# Patient Record
Sex: MALE | Race: OTHER | HISPANIC OR LATINO | URBAN - METROPOLITAN AREA
[De-identification: names, ages, dates, MRNs, and addresses within clinical notes are randomized per-mention and may not be internally consistent; named-entity substitution may affect disease eponyms.]

---

## 2023-01-20 ENCOUNTER — EMERGENCY (EMERGENCY)
Facility: HOSPITAL | Age: 43
LOS: 0 days | Discharge: HOME | End: 2023-01-20
Attending: STUDENT IN AN ORGANIZED HEALTH CARE EDUCATION/TRAINING PROGRAM | Admitting: STUDENT IN AN ORGANIZED HEALTH CARE EDUCATION/TRAINING PROGRAM
Payer: OTHER MISCELLANEOUS

## 2023-01-20 VITALS
DIASTOLIC BLOOD PRESSURE: 76 MMHG | TEMPERATURE: 98 F | RESPIRATION RATE: 17 BRPM | OXYGEN SATURATION: 99 % | WEIGHT: 179.9 LBS | HEIGHT: 67 IN | SYSTOLIC BLOOD PRESSURE: 136 MMHG | HEART RATE: 75 BPM
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
## 2023-01-20 DIAGNOSIS — S82.002A UNSPECIFIED FRACTURE OF LEFT PATELLA, INITIAL ENCOUNTER FOR CLOSED FRACTURE: ICD-10-CM
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
## 2023-01-20 DIAGNOSIS — M25.562 PAIN IN LEFT KNEE: ICD-10-CM

## 2023-01-20 DIAGNOSIS — M25.561 PAIN IN RIGHT KNEE: ICD-10-CM
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
## 2023-01-20 PROCEDURE — 73564 X-RAY EXAM KNEE 4 OR MORE: CPT | Mod: 26,50

## 2023-01-20 PROCEDURE — 73552 X-RAY EXAM OF FEMUR 2/>: CPT | Mod: 26,LT

## 2023-01-20 PROCEDURE — 99284 EMERGENCY DEPT VISIT MOD MDM: CPT

## 2023-01-20 PROCEDURE — 73590 X-RAY EXAM OF LOWER LEG: CPT | Mod: 26,LT

## 2023-01-20 PROCEDURE — 72170 X-RAY EXAM OF PELVIS: CPT | Mod: 26

## 2023-01-20 RX ORDER — OXYCODONE AND ACETAMINOPHEN 5; 325 MG/1; MG/1
1 TABLET ORAL
Qty: 9 | Refills: 0
Start: 2023-01-20 | End: 2023-01-22

## 2023-01-20 RX ORDER — KETOROLAC TROMETHAMINE 30 MG/ML
1 SYRINGE (ML) INJECTION
Qty: 16 | Refills: 0
Start: 2023-01-20 | End: 2023-01-23

## 2023-01-20 RX ORDER — KETOROLAC TROMETHAMINE 30 MG/ML
60 SYRINGE (ML) INJECTION ONCE
Refills: 0 | Status: DISCONTINUED | OUTPATIENT
Start: 2023-01-20 | End: 2023-01-20

## 2023-01-20 RX ADMIN — Medication 60 MILLIGRAM(S): at 19:57

## 2023-01-20 NOTE — ED PROVIDER NOTE - NSFOLLOWUPCLINICS_GEN_ALL_ED_FT
[facility]  Orthpedic  [address], [place]   Phone: [phone]  Fax:   Follow Up Time: 4-6 Days

## 2023-01-20 NOTE — ED PROVIDER NOTE - PHYSICAL EXAMINATION
PHYSICAL EXAM:    Constitutional: awake, alert, NAD  Eyes: EOMI, no conj injection  HENT: NC AT  Back: no c/t/l spine ttp  Respiratory: no respiratory distress, breath sounds equal b/l, no wheezing, rhonchi or stridor.   Cardiovascular: RRR nml S1S2  Gastrointestinal: soft, no masses, nontender, nondistended. No guarding or rebound.   Neurological: AAOx3, CN II-XII grossly intact, no focal numbness or focal weakness  MSK  LLE- no hip tenderness, no femur tenderness, swollen knee w/ anterior ecchymosis and tenderness most to superior/medial aspect, no severe joint laxity, no tib/fib tenderness, no ankle tenderness, ROM to ankle intact, DP 2+, sensation to extremity intact  RLE- no hip tenderness, no femur tenderness, minimally swollen knee w/o ecchymosis, mild tenderness to superior/medial aspect, no severe joint laxity, no tib/fib tenderness, no ankle tenderness, ROM to ankle intact, DP 2+, sensation to extremity intact

## 2023-01-20 NOTE — ED ADULT NURSE NOTE - CAS TRG GEN SKIN CONDITION
Body Location Override (Optional - Billing Will Still Be Based On Selected Body Map Location If Applicable): Right upper back Add 62717 Cpt? (Important Note: In 2017 The Use Of 02899 Is Being Tracked By Cms To Determine Future Global Period Reimbursement For Global Periods): no Detail Level: Detailed Warm

## 2023-01-20 NOTE — ED PROVIDER NOTE - PATIENT PORTAL LINK FT
You can access the FollowMyHealth Patient Portal offered by [facility] by registering at the following website: [email or web address]. By joining [employer]’s FollowMyHealth portal, you will also be able to view your health information using other applications (apps) compatible with our system.

## 2023-01-20 NOTE — ED PROVIDER NOTE - PROGRESS NOTE DETAILS
pain improved w/ toradol, xr w/ fx, knee immobilizer placed   pt ambulated in ED w/ crutches and is comfortable w/ dc

## 2023-01-20 NOTE — ED PROVIDER NOTE - DIFFERENTIAL DIAGNOSIS
c/f L knee fx, r/o patella fx, tib plateau fx, no e/o hip fx, dislocation   R knee- less likely fx, no e/o dislocation Differential Diagnosis

## 2023-01-20 NOTE — ED ADULT NURSE NOTE - NSIMPLEMENTINTERV_GEN_ALL_ED
Implemented All Fall with Harm Risk Interventions:  [place] to call system. Call bell, personal items and telephone within reach. Instruct patient to call for assistance. Room bathroom lighting operational. Non-slip footwear when patient is off stretcher. Physically safe environment: no spills, clutter or unnecessary equipment. Stretcher in lowest position, wheels locked, appropriate side rails in place. Provide visual cue, wrist band, yellow gown, etc. Monitor gait and stability. Monitor for mental status changes and reorient to person, place, and time. Review medications for side effects contributing to fall risk. Reinforce activity limits and safety measures with patient and family. Provide visual clues: red socks.

## 2023-01-20 NOTE — ED PROVIDER NOTE - CLINICAL SUMMARY MEDICAL DECISION MAKING FREE TEXT BOX
xr c/w patella fx- L knee placed in knee immobilizer, R knee ace wrapped  pt given crutches and able to ambulate  f/u and return precautions discussed  pt and family understands

## 2023-01-20 NOTE — ED ADULT TRIAGE NOTE - CHIEF COMPLAINT QUOTE
Pt is a  BIBA s/p fall from 5-ft high stilts while at work. Pt landed on his feet, c/o left knee pain. No head injury. No LOC. Not taking any anticoagulant.

## 2023-01-20 NOTE — ED PROVIDER NOTE - NSFOLLOWUPINSTRUCTIONS_ED_ALL_ED_FT
Pt arrives via EMS. Pt was with family getting breakfast and began having a difficult time breathing. Pt was here 2 weeks ago for pna. For EMS pt was in AF, pt does not recall ever having AF previously. -160s en route. Denies CP.
Take toradol and acetaminophen 650mg every 8 hours for pain.  Take percocet as needed for severe pain. Do note exceed 4000mg of acetaminophen in 24 hours. Follow up with orthopedic surgery within 1 week. Return for worsening pain, swelling, numbness/tingling/weakness to foot or other concerning symptoms.    Our Emergency Department Referral Coordinators will be reaching out to you in the next 24-48 hours from 9:00am to 5:00pm with a follow up appointment. Please expect a phone call from the hospital in that time frame. If you do not receive a call or if you have any questions or concerns, you can reach them at [phone] or [phone].          Fracture    A fracture is a break in one of your bones. This can occur from a variety of injuries, especially traumatic ones. Symptoms include pain, bruising, or swelling. Do not use the injured limb. If a fracture is in one of the bones below your waist, do not put weight on that limb unless instructed to do so by your healthcare provider. Crutches or a cane may have been provided. A splint or cast may have been applied by your health care provider. Make sure to keep it dry and follow up with an orthopedist as instructed.    SEEK IMMEDIATE MEDICAL CARE IF YOU HAVE ANY OF THE FOLLOWING SYMPTOMS: numbness, tingling, increasing pain, or weakness in any part of the injured limb.

## 2023-01-20 NOTE — ED PROVIDER NOTE - OBJECTIVE STATEMENT
42 yo m no pmh presents for eval of knee pain. 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 no head/neck injury. no chest/pelvis trauma. no loc/n/v. pt not on ac or antiplt.  pt states L knee hurts more than R and is swollen. R knee has mild pain.  no numbness/weakness to legs. no ankle pain/low back pain.

## 2023-01-23 ENCOUNTER — APPOINTMENT (OUTPATIENT)
Dept: ORTHOPEDIC SURGERY | Facility: CLINIC | Age: 43
End: 2023-01-23
Payer: OTHER MISCELLANEOUS

## 2023-01-23 PROBLEM — Z00.00 ENCOUNTER FOR PREVENTIVE HEALTH EXAMINATION: Status: ACTIVE | Noted: 2023-01-23

## 2023-01-23 PROCEDURE — 99072 ADDL SUPL MATRL&STAF TM PHE: CPT

## 2023-01-23 PROCEDURE — 99204 OFFICE O/P NEW MOD 45 MIN: CPT

## 2023-01-23 NOTE — HISTORY OF PRESENT ILLNESS
[de-identified] : 43 year old male presents with LT knee pain and swelling status post falling from about 5 feet above the ground at work which happened on 01/20/2023. He has a knee brace which is helping him. \par \par Denies any medical conditions or taking any medications.\par \par X-ray reviewed and analyzed. Shows a patella fracture, no arthritis,  displaced 3 cm distal pole with comminution\par \par On exam, ecchymosis  over the anterior aspect of the knee  ,no wounds, large knee effusion, significant swelling, tender to palpation unable to straight leg raise\par \par Recommending surgery to repair the medial and lateral capsule of the LT knee and fracture fixation or excision,for urgent surgery. He will follow-up with me next week Monday. son was present during the exam and discussion\par Surgical Discussion (general)\par \par The patient was advised of the diagnosis.  The natural history of the pathology was explained in full to the patient in layman's terms. All questions were answered.  The risks and benefits of surgical and non-surgical treatment alternatives were explained in full to the patient. \par \par The patient demonstrated a full understanding of the surgical and non-surgical options.  The risks of surgery were outlined in full to the patient including but not limited to bleeding, scarring, infection, sepsis, neurologic injury, vascular injury, failure to resolve symptoms, symptom recurrence, the need for further surgery, non-healing, wound breakdown, deep vein thrombosis, pulmonary embolism, spontaneous osteonecrosis, anesthesia complications and even death.  The patient understood all the risks and accepted them and understood that other complications could occur that are not mentioned above.  The intraoperative plan, post-operative plan, post-operative expectations and limitations were explained in full.  Expectations from non-surgical treatment were explained in full as well.  The patient demonstrated a complete understanding of the treatment alternatives and requested the above-mentioned procedure.  This will be scheduled accordingly.\par

## 2023-01-28 ENCOUNTER — LABORATORY RESULT (OUTPATIENT)
Age: 43
End: 2023-01-28

## 2023-01-30 ENCOUNTER — APPOINTMENT (OUTPATIENT)
Dept: ORTHOPEDIC SURGERY | Facility: CLINIC | Age: 43
End: 2023-01-30
Payer: OTHER MISCELLANEOUS

## 2023-01-30 VITALS — BODY MASS INDEX: 29.03 KG/M2 | WEIGHT: 185 LBS | HEIGHT: 67 IN

## 2023-01-30 PROCEDURE — 99214 OFFICE O/P EST MOD 30 MIN: CPT

## 2023-01-30 PROCEDURE — 99072 ADDL SUPL MATRL&STAF TM PHE: CPT

## 2023-01-30 NOTE — HISTORY OF PRESENT ILLNESS
[de-identified] : Patient comes in today for evaluation of the left knee with his nephew he has a knee immobilizer on and crutches\par \par On exam is got left knee effusion diffuse but mild pain anteriorly negative Homans sign no abrasions some resolving ecchymosis no blisters\par \par Discussed operative non operative treatment in detail rehab involved as well any schedule for this Friday\par Surgical Discussion (general)\par \par The patient was advised of the diagnosis.  The natural history of the pathology was explained in full to the patient in layman's terms. All questions were answered.  The risks and benefits of surgical and non-surgical treatment alternatives were explained in full to the patient. \par \par The patient demonstrated a full understanding of the surgical and non-surgical options.  The risks of surgery were outlined in full to the patient including but not limited to bleeding, scarring, infection, sepsis, neurologic injury, vascular injury, failure to resolve symptoms, symptom recurrence, the need for further surgery, non-healing, wound breakdown, deep vein thrombosis, pulmonary embolism, spontaneous osteonecrosis, anesthesia complications and even death.  The patient understood all the risks and accepted them and understood that other complications could occur that are not mentioned above.  The intraoperative plan, post-operative plan, post-operative expectations and limitations were explained in full.  Expectations from non-surgical treatment were explained in full as well.  The patient demonstrated a complete understanding of the treatment alternatives and requested the above-mentioned procedure.  This will be scheduled accordingly.\par

## 2023-02-03 ENCOUNTER — APPOINTMENT (OUTPATIENT)
Dept: ORTHOPEDIC SURGERY | Facility: AMBULATORY SURGERY CENTER | Age: 43
End: 2023-02-03
Payer: OTHER MISCELLANEOUS

## 2023-02-03 PROCEDURE — 27405 REPAIR OF KNEE LIGAMENT: CPT | Mod: LT

## 2023-02-03 PROCEDURE — 27524 TREAT KNEECAP FRACTURE: CPT | Mod: LT

## 2023-02-03 RX ORDER — OXYCODONE AND ACETAMINOPHEN 5; 325 MG/1; MG/1
5-325 TABLET ORAL
Qty: 20 | Refills: 0 | Status: ACTIVE | COMMUNITY
Start: 2023-02-03 | End: 1900-01-01

## 2023-02-03 RX ORDER — MELOXICAM 15 MG/1
15 TABLET ORAL
Qty: 30 | Refills: 0 | Status: ACTIVE | COMMUNITY
Start: 2023-02-03 | End: 1900-01-01

## 2023-02-03 RX ORDER — OXYCODONE AND ACETAMINOPHEN 5; 325 MG/1; MG/1
5-325 TABLET ORAL
Qty: 25 | Refills: 0 | Status: ACTIVE | COMMUNITY
Start: 2023-02-03 | End: 1900-01-01

## 2023-02-09 ENCOUNTER — APPOINTMENT (OUTPATIENT)
Dept: ORTHOPEDIC SURGERY | Facility: CLINIC | Age: 43
End: 2023-02-09
Payer: OTHER MISCELLANEOUS

## 2023-02-09 DIAGNOSIS — S82.009A UNSPECIFIED FRACTURE OF UNSPECIFIED PATELLA, INITIAL ENCOUNTER FOR CLOSED FRACTURE: ICD-10-CM

## 2023-02-09 PROCEDURE — 99024 POSTOP FOLLOW-UP VISIT: CPT

## 2023-02-09 NOTE — IMAGING
[de-identified] :   Exam of left knee is as follows:  Minimal effusion noted.  No erythema or ecchymosis.  incision is C/ D/I with staples intact.  Staples were removed and incision site was covered with Steri-Strips.  Explained to the patient that the Steri-Strips will fall off on their own.  Knee immobilizer intact with fiberglass securing the top and bottom portion.  no calf pain.  Negative Homans sign.  ROM not assessed since patient is in knee immobilizer.  Light touch intact throughout anterior aspect of knee.

## 2023-02-09 NOTE — HISTORY OF PRESENT ILLNESS
[de-identified] :    Patient is a 43-year-old male who reports the office 1st post postop visit status post Surgery on 02/03/2023.. He had surgery at [facility] for a left patella open  reduction and internal fixation, medial capsular repair, lateral capsular repair.\par \par He has been wearing his knee immobilizer as directed and has not taken it off.

## 2023-02-09 NOTE — DISCUSSION/SUMMARY
[de-identified] :   Patient is doing well status post surgery.  Ibuprofen 600 mg was sent to the patient's pharmacy to help alleviate his symptoms. We will keep him in his knee immobilizer for two months and then put him on a range of motion brace starting 0 to 30 degrees.    Explained to patient to keep this knee immobilizer on at all times and not taken off.  He understands and will comply.  He will cover the knee immobilizer while in the shower. \par \par Patient's staples were removed in the office today.  Incision site was covered with Steri-Strips.  He may weightbear as tolerated using his crutches if needed. Patient will follow-up in   3-4 weeks for further evaluation.  All of the patient's questions/concerns were answered in detail.  \par \par Patient was seen under the supervision of Dr. Helton.\par \par

## 2023-02-10 RX ORDER — IBUPROFEN 800 MG/1
800 TABLET, FILM COATED ORAL 3 TIMES DAILY
Qty: 42 | Refills: 1 | Status: ACTIVE | COMMUNITY
Start: 2023-02-10 | End: 1900-01-01

## 2023-02-23 ENCOUNTER — FORM ENCOUNTER (OUTPATIENT)
Age: 43
End: 2023-02-23

## 2023-03-01 ENCOUNTER — APPOINTMENT (OUTPATIENT)
Dept: ORTHOPEDIC SURGERY | Facility: CLINIC | Age: 43
End: 2023-03-01
Payer: OTHER MISCELLANEOUS

## 2023-03-01 PROCEDURE — 99024 POSTOP FOLLOW-UP VISIT: CPT

## 2023-03-01 PROCEDURE — 73562 X-RAY EXAM OF KNEE 3: CPT | Mod: LT

## 2023-03-01 NOTE — DATA REVIEWED
[Left] : left [Knee] : knee [FreeTextEntry1] : X-rays show that the fracture is in acceptable alignment.  3 views of the left knee were obtained.

## 2023-03-01 NOTE — HISTORY OF PRESENT ILLNESS
[de-identified] : The patient is a 43-year-old male accompanied by his son here for a subsequent reevaluation of his left knee.  He is status post left patella open reduction internal fixation we will and lateral capsular repair 2/3/2023.  He is wearing his knee immobilizer which has fiberglass at the proximal distal aspects of it.  His pain is well controlled.

## 2023-03-01 NOTE — PHYSICAL EXAM
[Left] : left knee [] : light touch is intact throughout [FreeTextEntry3] : Surgical wound is healing well.  Clean, dry, intact.  No surrounding erythema or drainage noted. [FreeTextEntry9] : Deferred. [de-identified] : Today he is ambulating with crutches.

## 2023-03-01 NOTE — DISCUSSION/SUMMARY
[de-identified] : He will stay in the immobilizer for 4 more weeks I will see him back in 4 weeks transition him to a [place] brace and open him to 30 degrees of flexion.\par \par Supervising Physician: Dr. Helton

## 2023-03-16 ENCOUNTER — FORM ENCOUNTER (OUTPATIENT)
Age: 43
End: 2023-03-16

## 2023-03-29 ENCOUNTER — APPOINTMENT (OUTPATIENT)
Dept: ORTHOPEDIC SURGERY | Facility: CLINIC | Age: 43
End: 2023-03-29

## 2023-04-03 ENCOUNTER — APPOINTMENT (OUTPATIENT)
Dept: ORTHOPEDIC SURGERY | Facility: CLINIC | Age: 43
End: 2023-04-03
Payer: OTHER MISCELLANEOUS

## 2023-04-03 ENCOUNTER — APPOINTMENT (OUTPATIENT)
Dept: ORTHOPEDIC SURGERY | Facility: CLINIC | Age: 43
End: 2023-04-03

## 2023-04-03 ENCOUNTER — NON-APPOINTMENT (OUTPATIENT)
Age: 43
End: 2023-04-03

## 2023-04-03 PROCEDURE — 99024 POSTOP FOLLOW-UP VISIT: CPT

## 2023-04-03 NOTE — PHYSICAL EXAM
[Left] : left knee [NL (0)] : extension 0 degrees [] : non-antalgic [FreeTextEntry3] : Surgical wound is healing well.  Clean, dry, intact.  No surrounding erythema or drainage noted. [de-identified] : Today he is ambulating with crutches. [TWNoteComboBox7] : flexion 30 degrees

## 2023-04-03 NOTE — DISCUSSION/SUMMARY
[de-identified] : Today the knee immobilizer was discontinued.  He was transitioned into a postoperative Tj brace and the brace was opened to 30 degrees of flexion.  He will work on his motion in his brace and straight leg raising at home.  I will see him back in 2 more weeks for further evaluation and to open his brace to 50 degrees of flexion.\par \par Supervising Physician: Dr. Helton

## 2023-04-03 NOTE — HISTORY OF PRESENT ILLNESS
[de-identified] : The patient is a 43-year-old male, accompanied by his daughter, here for a subsequent reevaluation of his left knee.  He is status post left patella open reduction internal fixation we will and lateral capsular repair 2/3/2023.  He is 2 months out from his surgery.  He is wearing his knee immobilizer which has fiberglass at the proximal and distal aspects of it.  No pain in his left knee.

## 2023-04-19 ENCOUNTER — APPOINTMENT (OUTPATIENT)
Dept: ORTHOPEDIC SURGERY | Facility: CLINIC | Age: 43
End: 2023-04-19
Payer: OTHER MISCELLANEOUS

## 2023-04-19 PROCEDURE — 99024 POSTOP FOLLOW-UP VISIT: CPT

## 2023-04-19 NOTE — DISCUSSION/SUMMARY
[de-identified] : Today his [place] brace was opened to 50 degrees of flexion.  He will work on his motion in his brace and straight leg raising at home.  I will see him back in 2 more weeks for further evaluation and to open his brace to 70 degrees of flexion.  I explained to him that he can weight bear as tolerated.\par \par Supervising Physician: Dr. Helton

## 2023-04-19 NOTE — PHYSICAL EXAM
[Left] : left knee [NL (0)] : extension 0 degrees [] : non-antalgic [FreeTextEntry3] : Mild effusion. [de-identified] : Today he is ambulating with crutches. [TWNoteComboBox7] : flexion 50 degrees

## 2023-04-19 NOTE — HISTORY OF PRESENT ILLNESS
[de-identified] : The patient is a 43-year-old male, accompanied by his daughter, here for a subsequent reevaluation of his left knee.  He is status post left patella open reduction internal fixation we will and lateral capsular repair 2/3/2023.  He is 2 and 1/2    months out from his surgery.  He is wearing his post operative [place] brace.

## 2023-05-03 ENCOUNTER — APPOINTMENT (OUTPATIENT)
Dept: ORTHOPEDIC SURGERY | Facility: CLINIC | Age: 43
End: 2023-05-03
Payer: OTHER MISCELLANEOUS

## 2023-05-03 PROCEDURE — 99024 POSTOP FOLLOW-UP VISIT: CPT

## 2023-05-03 NOTE — PHYSICAL EXAM
[Left] : left knee [] : non-antalgic [FreeTextEntry3] : Mild effusion. [FreeTextEntry9] : Slight extensor lag [TWNoteComboBox7] : flexion 50 degrees

## 2023-05-03 NOTE — HISTORY OF PRESENT ILLNESS
[de-identified] : The patient is a 43-year-old male, accompanied by his daughter, here for a subsequent reevaluation of his left knee.  He is status post left patella open reduction internal fixation we will and lateral capsular repair 2/3/2023.  He is 3 months out from his surgery.  He is wearing his post operative Tj brace today.

## 2023-05-03 NOTE — DISCUSSION/SUMMARY
[de-identified] : Today Dr. Helton and I saw the patient together.  His [place] brace was opened to 120 degrees of flexion.  We showed him how to do range of motion exercises for his knee here in the office today.  He will start formal therapy for stretching, quadricep strengthening, range of motion.  He will remain in the [place] brace.  We will see him in a month for further evaluation.\par \par He is unable to work, 100% temporary disabled.

## 2023-05-11 ENCOUNTER — FORM ENCOUNTER (OUTPATIENT)
Age: 43
End: 2023-05-11

## 2023-06-07 ENCOUNTER — APPOINTMENT (OUTPATIENT)
Dept: ORTHOPEDIC SURGERY | Facility: CLINIC | Age: 43
End: 2023-06-07
Payer: OTHER MISCELLANEOUS

## 2023-06-07 PROCEDURE — 99213 OFFICE O/P EST LOW 20 MIN: CPT | Mod: ACP

## 2023-06-07 NOTE — HISTORY OF PRESENT ILLNESS
[de-identified] : The patient is a 43-year-old male, accompanied by his daughter, here for a subsequent reevaluation of his left knee. He is status post left patella open reduction internal fixation we will and lateral capsular repair 2/3/2023. He is a little over 4 months out from his surgery. He is wearing his post operative [place] brace today.  He reports that his knee is improving.  He is doing formal physical therapy at [facility].\par

## 2023-06-07 NOTE — IMAGING
[de-identified] : Physical exam of the left knee: Mild effusion, he can straight leg raise.  Flexion to 90 degrees, full extension.  No tenderness to palpation over the medial or lateral joint lines of the anterior aspect of the left knee.

## 2023-06-07 NOTE — DISCUSSION/SUMMARY
[de-identified] : At this point he will remain in the [place] brace.  He will continue formal therapy.  We will see him in 3 weeks for further evaluation.  He is unable to work, 100% temporarily disabled.

## 2023-06-26 ENCOUNTER — APPOINTMENT (OUTPATIENT)
Dept: ORTHOPEDIC SURGERY | Facility: CLINIC | Age: 43
End: 2023-06-26
Payer: OTHER MISCELLANEOUS

## 2023-06-26 PROCEDURE — 99213 OFFICE O/P EST LOW 20 MIN: CPT | Mod: ACP

## 2023-06-26 NOTE — DISCUSSION/SUMMARY
[de-identified] : At this point his Tj brace is discontinued.  He will continue with formal therapy for the left knee, please send authorization for that.  He may weight-bear as tolerated with his brace.  He is unable to work, 100% temporarily disabled.  I will see him back in 6 weeks for further evaluation

## 2023-06-26 NOTE — HISTORY OF PRESENT ILLNESS
[de-identified] : The patient is a 43-year-old male here for a subsequent reevaluation of his left knee.  He is status post left patella open reduction, external fixation with medial and lateral capsular repair on 2/3/2023.  He is almost 5 months out from his surgery.  He states his knee is improving.  He is doing formal physical therapy at [facility].

## 2023-06-26 NOTE — IMAGING
[de-identified] : Physical exam of the left knee: No effusion, edema, no ecchymosis.  He is able to straight leg raise.  Full extension, flexion to 120 degrees.  There is left quadricep atrophy noted.  Intact to light touch and sensation, mild antalgic gait.

## 2023-08-08 ENCOUNTER — NON-APPOINTMENT (OUTPATIENT)
Age: 43
End: 2023-08-08

## 2023-08-08 ENCOUNTER — APPOINTMENT (OUTPATIENT)
Dept: ORTHOPEDIC SURGERY | Facility: CLINIC | Age: 43
End: 2023-08-08
Payer: OTHER MISCELLANEOUS

## 2023-08-08 DIAGNOSIS — S82.002D UNSPECIFIED FRACTURE OF LEFT PATELLA, SUBSEQUENT ENCOUNTER FOR CLOSED FRACTURE WITH ROUTINE HEALING: ICD-10-CM

## 2023-08-08 PROCEDURE — 99213 OFFICE O/P EST LOW 20 MIN: CPT | Mod: ACP

## 2023-08-08 RX ORDER — NABUMETONE 750 MG/1
750 TABLET, FILM COATED ORAL TWICE DAILY
Qty: 60 | Refills: 0 | Status: ACTIVE | COMMUNITY
Start: 2023-08-08 | End: 1900-01-01

## 2023-08-08 NOTE — DISCUSSION/SUMMARY
[de-identified] : At this point I recommend he continues with formal physical therapy please send authorization for that.  New Rx provided for that.  Rx for nabumetone sent to the pharmacy, he can use Tylenol for breakthrough pain.  He asked about stopping medication but I explained to him that we do not prescribe that.  He will continue working hard in therapy.  He will follow-up in 6 weeks for further evaluation with Dr. Helton.  He is unable to work, 100% temporarily disabled.

## 2023-08-08 NOTE — HISTORY OF PRESENT ILLNESS
[de-identified] : The patient is a 43-year-old male here for a subsequent reevaluation of his left knee.  He is status post left patella open reduction, external fixation with medial and lateral capsular repair on 2/3/2023.  He is a little over 6 months out from his surgery.  He reports he feels knee pain each day.  Sometimes it prevents him from sleeping.  He has tried ibuprofen 800 mg and Tylenol with minimal relief.

## 2023-08-08 NOTE — IMAGING
[de-identified] : Physical exam of the left knee: No effusion, edema, no ecchymosis.  He is able to straight leg raise.  Full extension, flexion to 120 degrees.  There is left quadricep atrophy noted.  Intact to light touch and sensation, mild antalgic gait.

## 2023-09-20 ENCOUNTER — APPOINTMENT (OUTPATIENT)
Dept: ORTHOPEDIC SURGERY | Facility: CLINIC | Age: 43
End: 2023-09-20

## 2023-10-05 NOTE — ED ADULT TRIAGE NOTE - WEIGHT METHOD
stated Closure 3 Information: This tab is for additional flaps and grafts above and beyond our usual structured repairs.  Please note if you enter information here it will not currently bill and you will need to add the billing information manually.

## 2023-10-19 ENCOUNTER — APPOINTMENT (OUTPATIENT)
Dept: ORTHOPEDIC SURGERY | Facility: CLINIC | Age: 43
End: 2023-10-19

## 2025-04-20 NOTE — ED ADULT NURSE NOTE - OBJECTIVE STATEMENT
BIBA s/p fall 5ft fell on his knees and hands, c/o B/L knee pain able to move legs. denies head trauma, denies LOC, denies anticoagulant.
No